# Patient Record
Sex: FEMALE | Race: OTHER | HISPANIC OR LATINO | Employment: FULL TIME | URBAN - METROPOLITAN AREA
[De-identification: names, ages, dates, MRNs, and addresses within clinical notes are randomized per-mention and may not be internally consistent; named-entity substitution may affect disease eponyms.]

---

## 2024-03-25 PROBLEM — Z76.89 ENCOUNTER TO ESTABLISH CARE: Status: ACTIVE | Noted: 2024-03-25

## 2024-03-25 NOTE — PROGRESS NOTES
Date: 04/02/2024  Patient ID: [id number]   Chief Complaint: Establish Care (New patient to establish care)    HPI:   Annamarie Rodarte is a 39 y.o. female who is here today to establish care. Moved to \[facility]\"" 2.5yrs ago. This is first PCP appt in 2yrs. Patient has L thyroid nodule that she has had for 10yrs and noticed that it has grown recently. Last US 4yrs ago was <2cm with normal TFTs. Denies pain, dysphagia, or other sx.  Patient has dysuria, frequent urination, for 6mos. Denies hematuria.  was dx with syphilis and HIV 3mos ago. Pt had bloodwork that was negative for HIV. Pt would like to get checked. Been with  for 12 yrs. Unsure of extramarital relations Not using condoms since she would like to have kids.  Patient also has intermittent L leg swelling, which improved with running.   Patient also has more frequent yellow loose stools, ongoing for 2yrs though. Denies stomach pain. Recently changed diet to healthier and more home cooked meals. Does drink 3 beers 3x/wk. Also has yellowing and thickening of toenail and bunion formation of feet. Sometimes has pain in R foot.       History reviewed. No pertinent past medical history.  Past Surgical History:   Procedure Laterality Date    COLONOSCOPY  2003    for bloody stools. polyp     Review of patient's allergies indicates:  No Known Allergies  No outpatient medications have been marked as taking for the 4/2/24 encounter (Office Visit) with Kena Hillman MD.     Family History   Problem Relation Age of Onset    Diabetes Mother     Vision loss Father     Cancer Maternal Grandfather     Arthritis Maternal Grandmother     Diabetes Maternal Grandmother     Cancer Paternal Grandfather       Social History     Socioeconomic History    Marital status:    Tobacco Use    Smoking status: Never    Smokeless tobacco: Never   Substance and Sexual Activity    Alcohol use: Yes     Alcohol/week: 12.0 standard drinks of alcohol     Types: 12 Cans of beer per week  "   Drug use: Never    Sexual activity: Yes     Partners: Male     Social Determinants of Health     Financial Resource Strain: Medium Risk (4/1/2024)    Overall Financial Resource Strain (CARDIA)     Difficulty of Paying Living Expenses: Somewhat hard   Food Insecurity: Food Insecurity Present (4/1/2024)    Hunger Vital Sign     Worried About Running Out of Food in the Last Year: Sometimes true     Ran Out of Food in the Last Year: Never true   Transportation Needs: No Transportation Needs (4/1/2024)    PRAPARE - Transportation     Lack of Transportation (Medical): No     Lack of Transportation (Non-Medical): No   Physical Activity: Insufficiently Active (4/1/2024)    Exercise Vital Sign     Days of Exercise per Week: 2 days     Minutes of Exercise per Session: 20 min   Stress: Stress Concern Present (4/1/2024)    [place] [employer] - Occupational Stress Questionnaire     Feeling of Stress : To some extent   Social Connections: Unknown (4/1/2024)    Social Connection and Isolation Panel [NHANES]     Frequency of Communication with Friends and Family: Twice a week     Frequency of Social Gatherings with Friends and Family: Never     Active Member of Clubs or Organizations: Yes     Attends Club or Organization Meetings: More than 4 times per year     Marital Status:    Housing Stability: Low Risk  (4/1/2024)    Housing Stability Vital Sign     Unable to Pay for Housing in the Last Year: No     Number of Places Lived in the Last Year: 1     Unstable Housing in the Last Year: No     Patient Care Team:  Kena Hillman MD as PCP - General (Family Medicine)   Subjective:   ROS  See HPI for details  All Other ROS: Negative except as stated in HPI  Objective:   /77   Pulse 63   Temp 98.6 °F (37 °C)   Ht 5' 4.57" (1.64 m)   Wt 108 kg (238 lb 1.6 oz)   SpO2 96%   BMI 40.16 kg/m²   Physical Exam  Constitutional:       Appearance: She is obese.   Neck:      Comments: L thyroid enlarged " and soft/mobile with NTTP or warmth  Musculoskeletal:      Comments: No edema BL LE. BL feet with lateral deviation   Neurological:      Mental Status: She is alert and oriented to person, place, and time.   Psychiatric:         Mood and Affect: Mood normal.         Behavior: Behavior normal.         Thought Content: Thought content normal.         Judgment: Judgment normal.       Assessment:       ICD-10-CM ICD-9-CM   1. Encounter to establish care  Z76.89 V65.8   2. Dysuria  R30.0 788.1   3. Thyroid nodule  E04.1 241.0   4. Exposure to sexually transmitted disease (STD)  Z20.2 V01.6   5. Class 3 severe obesity with body mass index (BMI) of 40.0 to 44.9 in adult, unspecified obesity type, unspecified whether serious comorbidity present  E66.01 278.01    Z68.41 V85.41   6. Left leg swelling  M79.89 729.81   7. Onychomycosis  B35.1 110.1   8. Bilateral bunions  M21.611 727.1    M21.612       Plan:   1. Encounter to establish care  Assessment & Plan:  Obtain routine labs  F/u for wellness      Orders:  -     CBC Auto Differential; Future; Expected date: 04/02/2024  -     Comprehensive Metabolic Panel; Future; Expected date: 04/02/2024    2. Dysuria  Assessment & Plan:  UA + C/S ordered. Order abx as necessary  Report any continuing/worsening symptoms after antibiotic completion such as nausea/vomiting, low back pain, blood in urine, burning with urination, or fever/body aches/chills, etc.  Drink plenty of water daily. Avoid soda and sugary drinks  Increase hydration  Wipe from front to back  Cotton underwear  Avoid douching  Empty bladder before and after intercourse  Urinate frequently. Do not hold urine for extended periods of time  ED precautions            Orders:  -     Urinalysis; Future; Expected date: 04/02/2024  -     Urine culture; Future; Expected date: 04/02/2024    3. Thyroid nodule  Assessment & Plan:  Obtain US and TFTs    Orders:  -     US Soft Tissue Head Neck; Future; Expected date: 04/02/2024  -      TSH; Future; Expected date: 04/02/2024  -     T4, Free; Future; Expected date: 04/02/2024    4. Exposure to sexually transmitted disease (STD)  Assessment & Plan:  Obtain STD panel  Safe sex practices    Orders:  -     CBC Auto Differential; Future; Expected date: 04/02/2024  -     Hepatitis C Antibody; Future; Expected date: 04/02/2024  -     HIV 1/2 Ag/Ab (4th Gen); Future; Expected date: 04/02/2024  -     RPR (DX) with reflex to titer and confirmatory testing; Future; Expected date: 04/02/2024  -     Hepatitis Panel, Acute; Future; Expected date: 04/02/2024    5. Class 3 severe obesity with body mass index (BMI) of 40.0 to 44.9 in adult, unspecified obesity type, unspecified whether serious comorbidity present  Assessment & Plan:  Discuss wt loss options next visit  Obtain routine labs    Orders:  -     CBC Auto Differential; Future; Expected date: 04/02/2024  -     Comprehensive Metabolic Panel; Future; Expected date: 04/02/2024  -     Lipid Panel; Future; Expected date: 04/02/2024  -     TSH; Future; Expected date: 04/02/2024  -     Hemoglobin A1C; Future; Expected date: 04/02/2024  -     Vitamin D; Future; Expected date: 04/02/2024    6. Left leg swelling  Assessment & Plan:  Continue to monitor        7. Onychomycosis  Assessment & Plan:  Refer to podiatry       8. Bilateral bunions  Assessment & Plan:  Refer to podiatry     Orders:  -     Ambulatory referral/consult to Podiatry; Future; Expected date: 04/09/2024                Follow up in about 4 weeks (around 4/30/2024) for Wellness, With Labs; pls print labs/orders for pt. In addition to their scheduled follow up, the patient has also been instructed to follow up on as needed basis.

## 2024-04-02 ENCOUNTER — OFFICE VISIT (OUTPATIENT)
Dept: PRIMARY CARE CLINIC | Facility: CLINIC | Age: 39
End: 2024-04-02
Payer: COMMERCIAL

## 2024-04-02 VITALS
TEMPERATURE: 99 F | HEART RATE: 63 BPM | OXYGEN SATURATION: 96 % | HEIGHT: 65 IN | SYSTOLIC BLOOD PRESSURE: 130 MMHG | BODY MASS INDEX: 39.67 KG/M2 | WEIGHT: 238.13 LBS | DIASTOLIC BLOOD PRESSURE: 77 MMHG

## 2024-04-02 DIAGNOSIS — M79.89 LEFT LEG SWELLING: ICD-10-CM

## 2024-04-02 DIAGNOSIS — M21.611 BILATERAL BUNIONS: ICD-10-CM

## 2024-04-02 DIAGNOSIS — R30.0 DYSURIA: ICD-10-CM

## 2024-04-02 DIAGNOSIS — B35.1 ONYCHOMYCOSIS: ICD-10-CM

## 2024-04-02 DIAGNOSIS — E04.1 THYROID NODULE: ICD-10-CM

## 2024-04-02 DIAGNOSIS — E66.01 CLASS 3 SEVERE OBESITY WITH BODY MASS INDEX (BMI) OF 40.0 TO 44.9 IN ADULT, UNSPECIFIED OBESITY TYPE, UNSPECIFIED WHETHER SERIOUS COMORBIDITY PRESENT: ICD-10-CM

## 2024-04-02 DIAGNOSIS — Z76.89 ENCOUNTER TO ESTABLISH CARE: Primary | ICD-10-CM

## 2024-04-02 DIAGNOSIS — M21.612 BILATERAL BUNIONS: ICD-10-CM

## 2024-04-02 DIAGNOSIS — Z20.2 EXPOSURE TO SEXUALLY TRANSMITTED DISEASE (STD): ICD-10-CM

## 2024-04-02 PROBLEM — E66.813 CLASS 3 SEVERE OBESITY WITH BODY MASS INDEX (BMI) OF 40.0 TO 44.9 IN ADULT: Status: ACTIVE | Noted: 2024-04-02

## 2024-04-02 PROCEDURE — 99204 OFFICE O/P NEW MOD 45 MIN: CPT | Mod: ,,, | Performed by: STUDENT IN AN ORGANIZED HEALTH CARE EDUCATION/TRAINING PROGRAM

## 2024-04-02 NOTE — ASSESSMENT & PLAN NOTE
JOSR ABREU did receive referral from provider regarding Pt is dealing with anxiety  After chart review JOSR ABREU did call Pt  JOSR ABREU introduced herself, also, explained Pt the purpose of this call  Pt stated that she wants to speak with a counselor  JOSR ABREU offered Pt to send a list of [address] resources  Pt stated that she would like to receive the resources by email  JOSR ABREU explained Pt that she will send the resources  Pt confirmed she received the resources  JOSR ABREU asked Pt if there is other social needs that she would like to share  Pt expressed that there is no other social needs at this time  JOSR ABREU explained Pt that JOSR ABREU will close this referral, and she can reach out [facility] for future assistance  Pt seems understanding and thankful for [facility] support  JOSR ABREU will close this referral since Pt declined social needs at this time  JOSR  is remain available for further assistance as needed  UA + C/S ordered. Order abx as necessary  Report any continuing/worsening symptoms after antibiotic completion such as nausea/vomiting, low back pain, blood in urine, burning with urination, or fever/body aches/chills, etc.  Drink plenty of water daily. Avoid soda and sugary drinks  Increase hydration  Wipe from front to back  Cotton underwear  Avoid douching  Empty bladder before and after intercourse  Urinate frequently. Do not hold urine for extended periods of time  ED precautions

## 2024-04-04 LAB
25(OH)D3+25(OH)D2 SERPL-MCNC: 9.2 NG/ML (ref 30–100)
APPEARANCE UR: CLEAR
BACTERIA #/AREA URNS HPF: ABNORMAL /[HPF]
BILIRUB UR QL STRIP: NEGATIVE
CHOLEST SERPL-MCNC: 186 MG/DL (ref 100–199)
COLOR UR: YELLOW
CRYSTALS URNS MICRO: ABNORMAL
EPI CELLS #/AREA URNS HPF: ABNORMAL /HPF (ref 0–10)
GLUCOSE UR QL STRIP: NEGATIVE
HAV IGM SERPL QL IA: NEGATIVE
HBA1C MFR BLD: 5.8 % (ref 4.8–5.6)
HBV CORE IGM SERPL QL IA: NEGATIVE
HBV SURFACE AG SERPL QL IA: NEGATIVE
HCV AB SERPL QL IA: NORMAL
HCV IGG SERPL QL IA: NON REACTIVE
HDLC SERPL-MCNC: 53 MG/DL
HGB UR QL STRIP: NEGATIVE
HIV 1+2 AB+HIV1 P24 AG SERPL QL IA: NON REACTIVE
KETONES UR QL STRIP: NEGATIVE
LDLC SERPL CALC-MCNC: 105 MG/DL (ref 0–99)
LEUKOCYTE ESTERASE UR QL STRIP: NEGATIVE
MICRO URNS: NORMAL
MICRO URNS: NORMAL
MUCOUS THREADS URNS QL MICRO: PRESENT
NITRITE UR QL STRIP: NEGATIVE
PH UR STRIP: 6 [PH] (ref 5–7.5)
PROT UR QL STRIP: NEGATIVE
RBC #/AREA URNS HPF: ABNORMAL /HPF (ref 0–2)
RPR SER QL: NON REACTIVE
SP GR UR STRIP: 1.02 (ref 1–1.03)
SPECIMEN STATUS REPORT: NORMAL
T4 FREE SERPL-MCNC: 1.29 NG/DL (ref 0.82–1.77)
TRIGL SERPL-MCNC: 161 MG/DL (ref 0–149)
TSH SERPL DL<=0.005 MIU/L-ACNC: 3.69 UIU/ML (ref 0.45–4.5)
UROBILINOGEN UR STRIP-MCNC: 0.2 MG/DL (ref 0.2–1)
VLDLC SERPL CALC-MCNC: 28 MG/DL (ref 5–40)
WBC #/AREA URNS HPF: ABNORMAL /HPF (ref 0–5)

## 2024-04-05 ENCOUNTER — DOCUMENTATION ONLY (OUTPATIENT)
Dept: PRIMARY CARE CLINIC | Facility: CLINIC | Age: 39
End: 2024-04-05
Payer: COMMERCIAL

## 2024-04-23 ENCOUNTER — HOSPITAL ENCOUNTER (OUTPATIENT)
Dept: RADIOLOGY | Facility: HOSPITAL | Age: 39
Discharge: HOME | End: 2024-04-23
Attending: STUDENT IN AN ORGANIZED HEALTH CARE EDUCATION/TRAINING PROGRAM
Payer: COMMERCIAL

## 2024-04-23 DIAGNOSIS — E04.1 THYROID NODULE: ICD-10-CM

## 2024-04-23 PROCEDURE — 76536 US EXAM OF HEAD AND NECK: CPT | Mod: TC

## 2024-04-24 ENCOUNTER — PATIENT MESSAGE (OUTPATIENT)
Dept: PRIMARY CARE CLINIC | Facility: CLINIC | Age: 39
End: 2024-04-24
Payer: COMMERCIAL

## 2024-05-01 PROBLEM — Z00.00 WELLNESS EXAMINATION: Status: ACTIVE | Noted: 2024-03-25

## 2024-05-01 NOTE — ASSESSMENT & PLAN NOTE
Provided Annamarie Rodarte with a 5-10 year written screening schedule and personal prevention plan. Recommendations were developed using the USPSTF age appropriate recommendations. Education, counseling, and referrals were provided as needed. After Visit Summary printed and given to patient which includes a list of additional screenings\tests needed.

## 2024-05-01 NOTE — PROGRESS NOTES
Date: 05/06/2024  Patient ID: [id number]   Chief Complaint: Annual Exam    HPI:   Annamarie Rodarte is a 39 y.o. female here today for an annual wellness visit. Reviewed and discussed lab results: Recent labs showed elevated TG, A1c 5.8, low vitD  Recent labs wnl except urobilibogen in UA, , 105 LDL.   Thyroid ultrasound showed dominant left lobe nodule that meets FNA criteria-refer to ENT/endocrine for biopsy. Has telemed endocrinology appt next month - only one available in [place]  Overall she feels well. Has dysuria/frequency    Diet: sandwich, tuna/crackers/salad, quesadilla; chicken/pork/beef/veggies. Does drink 12 drinks/week and also goes out and has drinks. Also has treats with donuts occasionally  Activity level: not much lately    Patient Active Problem List   Diagnosis    Wellness examination    Dysuria    Thyroid nodule    Exposure to sexually transmitted disease (STD)    Class 3 severe obesity with body mass index (BMI) of 40.0 to 44.9 in adult    Left leg swelling    Onychomycosis    Bilateral bunions    Vitamin D deficiency    Other hyperlipidemia    IGT (impaired glucose tolerance)     Current Outpatient Medications   Medication Sig Dispense Refill    carboxymethylcellulose-citric (PLENITY) 0.75 gram Cap Take 1 capsule by mouth once daily. 30 capsule 1    cholecalciferol, vitamin D3, 1,250 mcg (50,000 unit) capsule Take 1 capsule (50,000 Units total) by mouth every 7 days. for 12 doses 12 capsule 0    nitrofurantoin, macrocrystal-monohydrate, (MACROBID) 100 MG capsule Take 1 capsule (100 mg total) by mouth 2 (two) times daily. for 5 days 10 capsule 0     No current facility-administered medications for this visit.     No past medical history on file.  Past Surgical History:   Procedure Laterality Date    COLONOSCOPY  2003    for bloody stools. polyp     Review of patient's allergies indicates:  No Known Allergies  Family History   Problem Relation Name Age of Onset    Diabetes Mother Annamarie  Zuly Isabel     Vision loss Father Flash Rodarte     Cancer Maternal Grandfather Flaco Forbesnandez     Arthritis Maternal Grandmother Annamarie Evans Chalo Driscoll     Diabetes Maternal Grandmother Annamarie Isabel Americo     Cancer Paternal Grandfather Jeremías Rodarte       Social History     Socioeconomic History    Marital status:    Tobacco Use    Smoking status: Never    Smokeless tobacco: Never   Substance and Sexual Activity    Alcohol use: Yes     Alcohol/week: 12.0 standard drinks of alcohol     Types: 12 Cans of beer per week    Drug use: Never    Sexual activity: Yes     Partners: Male     Social Determinants of Health     Financial Resource Strain: Low Risk  (5/6/2024)    Overall Financial Resource Strain (CARDIA)     Difficulty of Paying Living Expenses: Not hard at all   Recent Concern: Financial Resource Strain - Medium Risk (4/1/2024)    Overall Financial Resource Strain (CARDIA)     Difficulty of Paying Living Expenses: Somewhat hard   Food Insecurity: No Food Insecurity (5/6/2024)    Hunger Vital Sign     Worried About Running Out of Food in the Last Year: Never true     Ran Out of Food in the Last Year: Never true   Recent Concern: Food Insecurity - Food Insecurity Present (4/1/2024)    Hunger Vital Sign     Worried About Running Out of Food in the Last Year: Sometimes true     Ran Out of Food in the Last Year: Never true   Transportation Needs: No Transportation Needs (4/1/2024)    PRAPARE - Transportation     Lack of Transportation (Medical): No     Lack of Transportation (Non-Medical): No   Physical Activity: Inactive (5/6/2024)    Exercise Vital Sign     Days of Exercise per Week: 0 days     Minutes of Exercise per Session: 0 min   Stress: No Stress Concern Present (5/6/2024)    [place] [employer] - Occupational Stress Questionnaire     Feeling of Stress : Not at all   Recent Concern: Stress - Stress Concern Present (4/1/2024)     "[place] [employer] - Occupational Stress Questionnaire     Feeling of Stress : To some extent   Housing Stability: Low Risk  (5/6/2024)    Housing Stability Vital Sign     Unable to Pay for Housing in the Last Year: No     Homeless in the Last Year: No     Patient Care Team:  Kena Hillman MD as PCP - General (Family Medicine)     Subjective:     Review of Systems   Constitutional: Negative.  Negative for fever and weight loss.   HENT:  Negative for congestion, hearing loss and sore throat.    Eyes:  Negative for blurred vision.   Respiratory:  Negative for cough and shortness of breath.    Cardiovascular:  Negative for chest pain, palpitations and leg swelling.   Gastrointestinal:  Negative for abdominal pain, blood in stool, constipation, diarrhea, nausea and vomiting.   Genitourinary:  Positive for dysuria, frequency and urgency. Negative for hematuria.   Musculoskeletal:  Negative for joint pain.   Skin:  Negative for rash.   Neurological:  Negative for weakness and headaches.   Psychiatric/Behavioral:  Negative for depression. The patient is not nervous/anxious and does not have insomnia.        See HPI for details  All Other ROS: Negative except as stated in HPI    Objective:     /83   Pulse 69   Ht 5' 4" (1.626 m)   Wt 110.8 kg (244 lb 3.2 oz)   SpO2 96%   BMI 41.92 kg/m²     Physical Exam  Vitals reviewed.   Constitutional:       Appearance: Normal appearance.   HENT:      Head: Normocephalic and atraumatic.      Right Ear: Tympanic membrane, ear canal and external ear normal.      Left Ear: Tympanic membrane, ear canal and external ear normal.      Nose: Nose normal. No congestion or rhinorrhea.      Mouth/Throat:      Mouth: Mucous membranes are moist.      Pharynx: Oropharynx is clear.   Eyes:      General: No scleral icterus.     Extraocular Movements: Extraocular movements intact.      Conjunctiva/sclera: Conjunctivae normal.   Neck:      Comments: Nodule palpable on " L lobe  Cardiovascular:      Rate and Rhythm: Normal rate and regular rhythm.      Pulses: Normal pulses.      Heart sounds: Normal heart sounds.   Pulmonary:      Effort: Pulmonary effort is normal.      Breath sounds: Normal breath sounds.   Abdominal:      General: Abdomen is flat. Bowel sounds are normal.      Palpations: Abdomen is soft.      Tenderness: There is no abdominal tenderness. There is no right CVA tenderness or left CVA tenderness.   Musculoskeletal:         General: No swelling or deformity. Normal range of motion.      Cervical back: Normal range of motion and neck supple.   Skin:     General: Skin is warm and dry.   Neurological:      Mental Status: She is alert and oriented to person, place, and time.   Psychiatric:         Mood and Affect: Mood normal.         Behavior: Behavior normal.         Thought Content: Thought content normal.         Judgment: Judgment normal.         Assessment:       ICD-10-CM ICD-9-CM   1. Wellness examination  Z00.00 V70.0   2. Vitamin D deficiency  E55.9 268.9   3. Other hyperlipidemia  E78.49 272.4   4. IGT (impaired glucose tolerance)  R73.02 790.22   5. Dysuria  R30.0 788.1   6. Class 3 severe obesity with body mass index (BMI) of 40.0 to 44.9 in adult, unspecified obesity type, unspecified whether serious comorbidity present  E66.01 278.01    Z68.41 V85.41   7. Thyroid nodule  E04.1 241.0        Plan:     1. Wellness examination  Assessment & Plan:  Provided Annamarie Rodarte with a 5-10 year written screening schedule and personal prevention plan. Recommendations were developed using the USPSTF age appropriate recommendations. Education, counseling, and referrals were provided as needed. After Visit Summary printed and given to patient which includes a list of additional screenings\tests needed.         2. Vitamin D deficiency  Assessment & Plan:  Vitamin D 50,000u po qwk x 12wks  Recheck level after completed supplementation      Orders:  -     cholecalciferol,  vitamin D3, 1,250 mcg (50,000 unit) capsule; Take 1 capsule (50,000 Units total) by mouth every 7 days. for 12 doses  Dispense: 12 capsule; Refill: 0    3. Other hyperlipidemia  Assessment & Plan:  The ASCVD Risk score (Sarita APPLE, et al., 2019) failed to calculate for the following reasons:    The 2019 ASCVD risk score is only valid for ages 40 to 79   Encouraged weight loss by least 5% and to increase aerobic exercise and resistance training  Limit simple sugars and simple carbohydrate intake-focus on low glycemic foods  Optimize blood sugar control  Can try omega-3 fatty acids, niacin  If still elevated at next lipid panel, consider adding statin or Zetia or Praluent  or Repatha for LDL/ Vascepa for TG and LDL          4. IGT (impaired glucose tolerance)  Assessment & Plan:  Prediabetic if A1c 5.7-6.4%, fasting glucose 100-125 mg/dL  Recommend 7% weight loss and exercise 150 minutes per week, decreased caloric intake (consider Mediterranean, DASH, vegetarian diet)  Follow ADA Diet. Avoid soda, simple sweets, and limit rice/pasta/breads/starches (no more than 45-50 grams per meal).  Maintain healthy weight with goal BMI <30.  Exercise at least 5 times per week for 30 minutes per day.  Associated with improved insulin sensitivity: Dietary fiber, coffee, cinnamon        5. Dysuria  Assessment & Plan:  Obtain UA and UCx    Orders:  -     Urinalysis; Future; Expected date: 05/06/2024  -     Urine culture; Future; Expected date: 05/06/2024  -     nitrofurantoin, macrocrystal-monohydrate, (MACROBID) 100 MG capsule; Take 1 capsule (100 mg total) by mouth 2 (two) times daily. for 5 days  Dispense: 10 capsule; Refill: 0    6. Class 3 severe obesity with body mass index (BMI) of 40.0 to 44.9 in adult, unspecified obesity type, unspecified whether serious comorbidity present  Assessment & Plan:  Attempt Plenity qd  Lifestyle modifications    Orders:  -     carboxymethylcellulose-citric (PLENITY) 0.75 gram Cap; Take 1 capsule  by mouth once daily.  Dispense: 30 capsule; Refill: 1    7. Thyroid nodule  Assessment & Plan:  F/u with endocrinology for bx           Medication List with Changes/Refills   New Medications    CARBOXYMETHYLCELLULOSE-CITRIC (PLENITY) 0.75 GRAM CAP    Take 1 capsule by mouth once daily.       Start Date: 5/6/2024  End Date: --    CHOLECALCIFEROL, VITAMIN D3, 1,250 MCG (50,000 UNIT) CAPSULE    Take 1 capsule (50,000 Units total) by mouth every 7 days. for 12 doses       Start Date: 5/6/2024  End Date: 7/23/2024    NITROFURANTOIN, MACROCRYSTAL-MONOHYDRATE, (MACROBID) 100 MG CAPSULE    Take 1 capsule (100 mg total) by mouth 2 (two) times daily. for 5 days       Start Date: 5/6/2024  End Date: 5/11/2024        The patient's Health Maintenance was reviewed and the following appears to be due at this time:   Health Maintenance Due   Topic Date Due    Cervical Cancer Screening  Never done    TETANUS VACCINE  Never done    COVID-19 Vaccine (1 - 2023-24 season) Never done     Health Maintenance Topics with due status: Not Due       Topic Last Completion Date    Hemoglobin A1c (Prediabetes) 04/03/2024    Influenza Vaccine Not Due        Alcohol/Tobacco Use - Discussed importance of smoking avoidance/cessation and limiting alcohol intake.    CVD Risk Factors - Reviewed and discussed with patient    Obesity/Physical Activity - BMI = Body mass index is 41.92 kg/m².. Encouraged 30 minute daily physical activity, 5 days per week.     STD screening (At least once 15-65y or when necessary) - negative     Depression screening (Every year or when necessary)- negative    Breast Cancer Screening - futre    Cervical Cancer Screening - 2022 wnl and benign cyst. Rpt in 3-5yrs    Colon Cancer Screening - future    Osteoporosis Screening (start at 66yo or 50y with risk) - future    Eye Exam - Recommend annual eye exams.    Dental Exam - Recommend biannual exams.     Vaccinations - will obtain at pharmacy    Follow up in about 4 weeks (around  6/3/2024) for Weight loss, Print labs for pt. In addition to their scheduled follow up, the patient has also been instructed to follow up on as needed basis.

## 2024-05-06 ENCOUNTER — OFFICE VISIT (OUTPATIENT)
Dept: PRIMARY CARE CLINIC | Facility: CLINIC | Age: 39
End: 2024-05-06
Payer: COMMERCIAL

## 2024-05-06 VITALS
HEART RATE: 69 BPM | BODY MASS INDEX: 41.69 KG/M2 | WEIGHT: 244.19 LBS | DIASTOLIC BLOOD PRESSURE: 83 MMHG | SYSTOLIC BLOOD PRESSURE: 130 MMHG | HEIGHT: 64 IN | OXYGEN SATURATION: 96 %

## 2024-05-06 DIAGNOSIS — Z00.00 WELLNESS EXAMINATION: Primary | ICD-10-CM

## 2024-05-06 DIAGNOSIS — R73.02 IGT (IMPAIRED GLUCOSE TOLERANCE): ICD-10-CM

## 2024-05-06 DIAGNOSIS — E55.9 VITAMIN D DEFICIENCY: ICD-10-CM

## 2024-05-06 DIAGNOSIS — R30.0 DYSURIA: ICD-10-CM

## 2024-05-06 DIAGNOSIS — E78.49 OTHER HYPERLIPIDEMIA: ICD-10-CM

## 2024-05-06 DIAGNOSIS — E66.01 CLASS 3 SEVERE OBESITY WITH BODY MASS INDEX (BMI) OF 40.0 TO 44.9 IN ADULT, UNSPECIFIED OBESITY TYPE, UNSPECIFIED WHETHER SERIOUS COMORBIDITY PRESENT: ICD-10-CM

## 2024-05-06 DIAGNOSIS — E04.1 THYROID NODULE: ICD-10-CM

## 2024-05-06 PROCEDURE — 99213 OFFICE O/P EST LOW 20 MIN: CPT | Mod: 25,,, | Performed by: STUDENT IN AN ORGANIZED HEALTH CARE EDUCATION/TRAINING PROGRAM

## 2024-05-06 PROCEDURE — 99395 PREV VISIT EST AGE 18-39: CPT | Mod: ,,, | Performed by: STUDENT IN AN ORGANIZED HEALTH CARE EDUCATION/TRAINING PROGRAM

## 2024-05-06 RX ORDER — CARBOXYMETHYLCELLULOSE/CITRIC 0.75 G
1 CAPSULE ORAL DAILY
Qty: 30 CAPSULE | Refills: 1 | Status: SHIPPED | OUTPATIENT
Start: 2024-05-06 | End: 2024-06-03 | Stop reason: SDUPTHER

## 2024-05-06 RX ORDER — ASPIRIN 325 MG
50000 TABLET, DELAYED RELEASE (ENTERIC COATED) ORAL
Qty: 12 CAPSULE | Refills: 0 | Status: SHIPPED | OUTPATIENT
Start: 2024-05-06 | End: 2024-07-23

## 2024-05-06 RX ORDER — NITROFURANTOIN 25; 75 MG/1; MG/1
100 CAPSULE ORAL 2 TIMES DAILY
Qty: 10 CAPSULE | Refills: 0 | Status: SHIPPED | OUTPATIENT
Start: 2024-05-06 | End: 2024-05-11

## 2024-05-06 NOTE — ASSESSMENT & PLAN NOTE
The ASCVD Risk score (Sarita APPLE, et al., 2019) failed to calculate for the following reasons:    The 2019 ASCVD risk score is only valid for ages 40 to 79   Encouraged weight loss by least 5% and to increase aerobic exercise and resistance training  Limit simple sugars and simple carbohydrate intake-focus on low glycemic foods  Optimize blood sugar control  Can try omega-3 fatty acids, niacin  If still elevated at next lipid panel, consider adding statin or Zetia or Praluent  or Repatha for LDL/ Vascepa for TG and LDL

## 2024-05-09 ENCOUNTER — TELEPHONE (OUTPATIENT)
Dept: PRIMARY CARE CLINIC | Facility: CLINIC | Age: 39
End: 2024-05-09
Payer: COMMERCIAL

## 2024-05-09 DIAGNOSIS — E04.1 THYROID NODULE: Primary | ICD-10-CM

## 2024-05-09 NOTE — TELEPHONE ENCOUNTER
----- Message from [facility] sent at 5/9/2024  1:44 PM CDT -----  .Type:  Patient Requesting Referral    Who Called: pt    Does the patient already have the specialty appointment scheduled?: no    If yes, what is the date of that appointment?: no    Referral to What Specialty: Endocrinologist     Reason for Referral: thyroid nodule    Does the patient want the referral with a specific physician?: Dr. Willi Mujica fax# [phone]    Is the specialist an [facility]?: non    Patient Requesting a Response?: yes    Would the patient rather a call back or a response via [facility]?      Best Call Back Number: [phone]    Additional Information:  pt wants this referral to go to this doctor

## 2024-05-28 NOTE — PROGRESS NOTES
Date: 06/03/2024  Patient ID: [id number]   Chief Complaint: Follow-up (Discuss medications)    HPI:   Annamarie Rodarte is a 39 y.o. female here today for Follow-up (Discuss medications)  Last visit Plenity was started for obesity, which she was unable to obtain since not in stock at pharmacy. She has lost a couple pounds since last visit. She was also started on terbinafine for toenail fungus and Mobic for pain/inflammation. She had UA done last week for UTI sx, which she just completed Macrobid course. Her UTI symptoms have improved, and recent UA was wnl.     Patient Active Problem List   Diagnosis    Wellness examination    Thyroid nodule    Exposure to sexually transmitted disease (STD)    Class 3 severe obesity with body mass index (BMI) of 40.0 to 44.9 in adult    Left leg swelling    Onychomycosis    Bilateral bunions    Vitamin D deficiency    Other hyperlipidemia    IGT (impaired glucose tolerance)     Outpatient Medications Marked as Taking for the 6/3/24 encounter (Office Visit) with Kena Hillman MD   Medication Sig Dispense Refill    cholecalciferol, vitamin D3, 1,250 mcg (50,000 unit) capsule Take 1 capsule (50,000 Units total) by mouth every 7 days. for 12 doses 12 capsule 0    meloxicam (MOBIC) 15 MG tablet Take 15 mg by mouth.      terbinafine HCL (LAMISIL) 250 mg tablet Take 250 mg by mouth.       Past Medical History:   Diagnosis Date    Dysuria 04/02/2024     Past Surgical History:   Procedure Laterality Date    COLONOSCOPY  2003    for bloody stools. polyp     Review of patient's allergies indicates:  No Known Allergies  Family History   Problem Relation Name Age of Onset    Diabetes Mother Giulia Diogo Isabel     Vision loss Father Flash Rodarte     Cancer Maternal Grandfather Flaco Holtjohnathan Martinez     Arthritis Maternal Grandmother Annamarie Driscoll     Diabetes Maternal Grandmother Annamarie Driscoll     Cancer Paternal Grandfather Jeremías Rodarte        Social History     Socioeconomic History    Marital status:    Tobacco Use    Smoking status: Never    Smokeless tobacco: Never   Substance and Sexual Activity    Alcohol use: Yes     Alcohol/week: 12.0 standard drinks of alcohol     Types: 12 Cans of beer per week    Drug use: Never    Sexual activity: Yes     Partners: Male     Social Determinants of Health     Financial Resource Strain: Low Risk  (5/6/2024)    Overall Financial Resource Strain (CARDIA)     Difficulty of Paying Living Expenses: Not hard at all   Recent Concern: Financial Resource Strain - Medium Risk (4/1/2024)    Overall Financial Resource Strain (CARDIA)     Difficulty of Paying Living Expenses: Somewhat hard   Food Insecurity: No Food Insecurity (5/6/2024)    Hunger Vital Sign     Worried About Running Out of Food in the Last Year: Never true     Ran Out of Food in the Last Year: Never true   Recent Concern: Food Insecurity - Food Insecurity Present (4/1/2024)    Hunger Vital Sign     Worried About Running Out of Food in the Last Year: Sometimes true     Ran Out of Food in the Last Year: Never true   Transportation Needs: No Transportation Needs (5/6/2024)    TRANSPORTATION NEEDS     Transportation : No   Physical Activity: Inactive (5/6/2024)    Exercise Vital Sign     Days of Exercise per Week: 0 days     Minutes of Exercise per Session: 0 min   Stress: No Stress Concern Present (5/6/2024)    [employer] - Occupational Stress Questionnaire     Feeling of Stress : Not at all   Recent Concern: Stress - Stress Concern Present (4/1/2024)    [employer] - Occupational Stress Questionnaire     Feeling of Stress : To some extent   Housing Stability: Low Risk  (5/6/2024)    Housing Stability Vital Sign     Unable to Pay for Housing in the Last Year: No     Homeless in the Last Year: No     Patient Care Team:  Kena Hillman MD as PCP - General (Family Medicine)   Subjective:  "  ROS  See HPI for details  All Other ROS: Negative except as stated in HPI.   Objective:   /75   Pulse 63   Ht 5' 4" (1.626 m)   Wt 110 kg (242 lb 9.6 oz)   SpO2 95%   BMI 41.64 kg/m²   Physical Exam  General: NAD  Eye: EOMI  HENT: no nasal discharge  Respiratory: non-labored breathing  Musculoskeletal: ambulates independently. No obvious deformity  Integumentary: no apparent discoloration  Neurologic: Alert, oriented to person and situation  Cognition and Speech: Speech clear and coherent.   Psychiatric: Cooperative    Assessment:       ICD-10-CM ICD-9-CM   1. Class 3 severe obesity due to excess calories without serious comorbidity with body mass index (BMI) of 40.0 to 44.9 in adult  E66.01 278.01    Z68.41 V85.41      Plan:   1. Class 3 severe obesity due to excess calories without serious comorbidity with body mass index (BMI) of 40.0 to 44.9 in adult  Assessment & Plan:  Attempt Plenity with printed rx  Consider alternative if not available  Continue lifestyle modifications    Orders:  -     carboxymethylcellulose-citric (PLENITY) 0.75 gram Cap; Take 1 capsule by mouth once daily.  Dispense: 30 capsule; Refill: 3         Medication List with Changes/Refills   Current Medications    CHOLECALCIFEROL, VITAMIN D3, 1,250 MCG (50,000 UNIT) CAPSULE    Take 1 capsule (50,000 Units total) by mouth every 7 days. for 12 doses       Start Date: 5/6/2024  End Date: 7/23/2024    MELOXICAM (MOBIC) 15 MG TABLET    Take 15 mg by mouth.       Start Date: 5/15/2024 End Date: --    TERBINAFINE HCL (LAMISIL) 250 MG TABLET    Take 250 mg by mouth.       Start Date: 5/15/2024 End Date: --   Changed and/or Refilled Medications    Modified Medication Previous Medication    CARBOXYMETHYLCELLULOSE-CITRIC (PLENITY) 0.75 GRAM CAP carboxymethylcellulose-citric (PLENITY) 0.75 gram Cap       Take 1 capsule by mouth once daily.    Take 1 capsule by mouth once daily.       Start Date: 6/3/2024  End Date: --    Start Date: 5/6/2024  " End Date: 6/3/2024        Follow up in about 8 weeks (around 7/29/2024) for Obesity. In addition to their scheduled follow up, the patient has also been instructed to follow up on as needed basis.

## 2024-06-03 ENCOUNTER — OFFICE VISIT (OUTPATIENT)
Dept: PRIMARY CARE CLINIC | Facility: CLINIC | Age: 39
End: 2024-06-03
Payer: COMMERCIAL

## 2024-06-03 VITALS
HEART RATE: 63 BPM | OXYGEN SATURATION: 95 % | HEIGHT: 64 IN | SYSTOLIC BLOOD PRESSURE: 119 MMHG | DIASTOLIC BLOOD PRESSURE: 75 MMHG | WEIGHT: 242.63 LBS | BODY MASS INDEX: 41.42 KG/M2

## 2024-06-03 DIAGNOSIS — E66.01 CLASS 3 SEVERE OBESITY DUE TO EXCESS CALORIES WITHOUT SERIOUS COMORBIDITY WITH BODY MASS INDEX (BMI) OF 40.0 TO 44.9 IN ADULT: Primary | ICD-10-CM

## 2024-06-03 PROBLEM — R30.0 DYSURIA: Status: RESOLVED | Noted: 2024-04-02 | Resolved: 2024-06-03

## 2024-06-03 PROCEDURE — 99214 OFFICE O/P EST MOD 30 MIN: CPT | Mod: ,,, | Performed by: STUDENT IN AN ORGANIZED HEALTH CARE EDUCATION/TRAINING PROGRAM

## 2024-06-03 RX ORDER — CARBOXYMETHYLCELLULOSE/CITRIC 0.75 G
1 CAPSULE ORAL DAILY
Qty: 30 CAPSULE | Refills: 3 | Status: SHIPPED | OUTPATIENT
Start: 2024-06-03

## 2024-06-03 RX ORDER — MELOXICAM 15 MG/1
15 TABLET ORAL
COMMUNITY
Start: 2024-05-15

## 2024-06-03 RX ORDER — TERBINAFINE HYDROCHLORIDE 250 MG/1
250 TABLET ORAL
COMMUNITY
Start: 2024-05-15

## 2024-06-03 NOTE — ASSESSMENT & PLAN NOTE
Attempt Plenity with printed rx  Consider alternative if not available  Continue lifestyle modifications

## 2024-06-13 ENCOUNTER — PATIENT MESSAGE (OUTPATIENT)
Dept: PRIMARY CARE CLINIC | Facility: CLINIC | Age: 39
End: 2024-06-13
Payer: COMMERCIAL

## 2024-06-13 DIAGNOSIS — E66.01 CLASS 3 SEVERE OBESITY DUE TO EXCESS CALORIES WITHOUT SERIOUS COMORBIDITY WITH BODY MASS INDEX (BMI) OF 40.0 TO 44.9 IN ADULT: Primary | ICD-10-CM

## 2024-06-14 NOTE — TELEPHONE ENCOUNTER
Please inform patient that Orlistat was sent to pharmacy. It may cause stomach upset. If this doesn't work, we can set up telemed visit to discuss    Thank you,    Dr. Hillman

## 2024-07-23 NOTE — PROGRESS NOTES
Date: 08/01/2024  Patient ID: [id number]   Chief Complaint: Follow-up    HPI:   Annamarie Rodarte is a 39 y.o. female here today for Follow-up  Last visit Plenity was attempted for obesity, but pt could not obtain medication. She is taking Christina. She is eating healthy but does drink 2beers in evening. Would like medication that helps with weight loss and drinking    Patient Active Problem List   Diagnosis    Wellness examination    Thyroid nodule    Exposure to sexually transmitted disease (STD)    Class 3 severe obesity with body mass index (BMI) of 40.0 to 44.9 in adult    Left leg swelling    Onychomycosis    Bilateral bunions    Vitamin D deficiency    Other hyperlipidemia    IGT (impaired glucose tolerance)    Alcohol use     Outpatient Medications Marked as Taking for the 8/1/24 encounter (Office Visit) with Kena Hillman MD   Medication Sig Dispense Refill    meloxicam (MOBIC) 15 MG tablet Take 15 mg by mouth.      orlistat (CHRISTINA) 60 MG capsule Take 1 capsule (60 mg total) by mouth 3 (three) times daily with meals. 90 capsule 11    terbinafine HCL (LAMISIL) 250 mg tablet Take 250 mg by mouth.       Past Medical History:   Diagnosis Date    Dysuria 04/02/2024     Past Surgical History:   Procedure Laterality Date    COLONOSCOPY  2003    for bloody stools. polyp     Review of patient's allergies indicates:  No Known Allergies  Family History   Problem Relation Name Age of Onset    Diabetes Mother Annmaarie Caruso Diogo Isabel     Vision loss Father Flash Rodarte     Cancer Maternal Grandfather Flaco Diogo Martinez     Arthritis Maternal Grandmother Annamarie Driscoll     Diabetes Maternal Grandmother Annamarie Driscoll     Cancer Paternal Grandfather Jeremías Rodarte       Social History     Socioeconomic History    Marital status:    Tobacco Use    Smoking status: Never    Smokeless tobacco: Never   Substance and Sexual Activity    Alcohol use: Yes     Alcohol/week: 12.0 standard  "drinks of alcohol     Types: 12 Cans of beer per week    Drug use: Never    Sexual activity: Yes     Partners: Male     Social Determinants of Health     Financial Resource Strain: Low Risk  (5/6/2024)    Overall Financial Resource Strain (CARDIA)     Difficulty of Paying Living Expenses: Not hard at all   Recent Concern: Financial Resource Strain - Medium Risk (4/1/2024)    Overall Financial Resource Strain (CARDIA)     Difficulty of Paying Living Expenses: Somewhat hard   Food Insecurity: No Food Insecurity (5/6/2024)    Hunger Vital Sign     Worried About Running Out of Food in the Last Year: Never true     Ran Out of Food in the Last Year: Never true   Recent Concern: Food Insecurity - Food Insecurity Present (4/1/2024)    Hunger Vital Sign     Worried About Running Out of Food in the Last Year: Sometimes true     Ran Out of Food in the Last Year: Never true   Transportation Needs: No Transportation Needs (5/6/2024)    TRANSPORTATION NEEDS     Transportation : No   Physical Activity: Inactive (5/6/2024)    Exercise Vital Sign     Days of Exercise per Week: 0 days     Minutes of Exercise per Session: 0 min   Stress: No Stress Concern Present (5/6/2024)    [employer] [employer] - Occupational Stress Questionnaire     Feeling of Stress : Not at all   Recent Concern: Stress - Stress Concern Present (4/1/2024)    [employer] [place] [employer] - Occupational Stress Questionnaire     Feeling of Stress : To some extent   Housing Stability: Low Risk  (5/6/2024)    Housing Stability Vital Sign     Unable to Pay for Housing in the Last Year: No     Homeless in the Last Year: No     Patient Care Team:  Kena Hillman MD as PCP - General (Family Medicine)   Subjective:   ROS  See HPI for details  All Other ROS: Negative except as stated in HPI.   Objective:   /78 (BP Location: Right arm, Patient Position: Sitting, BP Method: Large (Automatic))   Pulse 60   Resp 17   Ht 5' 4" (1.626 " m)   Wt 107 kg (236 lb)   SpO2 97%   BMI 40.51 kg/m²   Physical Exam  Constitutional:       Appearance: Normal appearance. She is obese.   Pulmonary:      Effort: Pulmonary effort is normal.   Neurological:      Mental Status: She is alert and oriented to person, place, and time.       Assessment:       ICD-10-CM ICD-9-CM   1. Class 3 severe obesity due to excess calories without serious comorbidity with body mass index (BMI) of 40.0 to 44.9 in adult  E66.01 278.01    Z68.41 V85.41   2. Alcohol use  Z78.9 V49.89      Plan:   1. Class 3 severe obesity due to excess calories without serious comorbidity with body mass index (BMI) of 40.0 to 44.9 in adult  Overview:  Plenity unavailable  Christina helped some    Assessment & Plan:  Attempt contrave for weight loss and drinking. AE discussed. Sample given to patient  Continue lifestyle modifications    Orders:  -     naltrexone-bupropion (CONTRAVE) 8-90 mg TbSR; Take 1 tablet by mouth 2 (two) times daily.  Dispense: 60 tablet; Refill: 11    2. Alcohol use  Assessment & Plan:  Alcohol cessation counseling 3-5min  Attempt Contrave for wt loss and alcohol             Medication List with Changes/Refills   New Medications    NALTREXONE-BUPROPION (CONTRAVE) 8-90 MG TBSR    Take 1 tablet by mouth 2 (two) times daily.       Start Date: 8/1/2024  End Date: --   Current Medications    MELOXICAM (MOBIC) 15 MG TABLET    Take 15 mg by mouth.       Start Date: 5/15/2024 End Date: --    ORLISTAT (CHRISTINA) 60 MG CAPSULE    Take 1 capsule (60 mg total) by mouth 3 (three) times daily with meals.       Start Date: 6/14/2024 End Date: 6/14/2025    TERBINAFINE HCL (LAMISIL) 250 MG TABLET    Take 250 mg by mouth.       Start Date: 5/15/2024 End Date: --   Discontinued Medications    CARBOXYMETHYLCELLULOSE-CITRIC (PLENITY) 0.75 GRAM CAP    Take 1 capsule by mouth once daily.       Start Date: 6/3/2024  End Date: 8/1/2024        Follow up in about 4 weeks (around 8/29/2024) for Obesity, Weight  loss. In addition to their scheduled follow up, the patient has also been instructed to follow up on as needed basis.

## 2024-08-01 ENCOUNTER — OFFICE VISIT (OUTPATIENT)
Dept: PRIMARY CARE CLINIC | Facility: CLINIC | Age: 39
End: 2024-08-01
Payer: COMMERCIAL

## 2024-08-01 VITALS
HEART RATE: 60 BPM | WEIGHT: 236 LBS | OXYGEN SATURATION: 97 % | HEIGHT: 64 IN | RESPIRATION RATE: 17 BRPM | SYSTOLIC BLOOD PRESSURE: 121 MMHG | BODY MASS INDEX: 40.29 KG/M2 | DIASTOLIC BLOOD PRESSURE: 78 MMHG

## 2024-08-01 DIAGNOSIS — Z78.9 ALCOHOL USE: ICD-10-CM

## 2024-08-01 DIAGNOSIS — E66.01 CLASS 3 SEVERE OBESITY DUE TO EXCESS CALORIES WITHOUT SERIOUS COMORBIDITY WITH BODY MASS INDEX (BMI) OF 40.0 TO 44.9 IN ADULT: Primary | ICD-10-CM

## 2024-08-01 PROBLEM — F10.90 ALCOHOL USE: Status: ACTIVE | Noted: 2024-08-01

## 2024-08-01 PROCEDURE — 99214 OFFICE O/P EST MOD 30 MIN: CPT | Mod: ,,, | Performed by: STUDENT IN AN ORGANIZED HEALTH CARE EDUCATION/TRAINING PROGRAM

## 2024-08-01 NOTE — ASSESSMENT & PLAN NOTE
Attempt contrave for weight loss and drinking. AE discussed. Sample given to patient  Continue lifestyle modifications

## 2024-08-05 PROBLEM — Z00.00 WELLNESS EXAMINATION: Status: RESOLVED | Noted: 2024-03-25 | Resolved: 2024-08-05

## 2024-08-07 ENCOUNTER — TELEPHONE (OUTPATIENT)
Dept: PRIMARY CARE CLINIC | Facility: CLINIC | Age: 39
End: 2024-08-07
Payer: COMMERCIAL

## 2024-08-07 DIAGNOSIS — E66.01 CLASS 3 SEVERE OBESITY DUE TO EXCESS CALORIES WITHOUT SERIOUS COMORBIDITY WITH BODY MASS INDEX (BMI) OF 40.0 TO 44.9 IN ADULT: Primary | ICD-10-CM

## 2024-08-07 RX ORDER — NALTREXONE HYDROCHLORIDE 50 MG/1
50 TABLET, FILM COATED ORAL DAILY
Qty: 30 TABLET | Refills: 11 | Status: SHIPPED | OUTPATIENT
Start: 2024-08-07

## 2024-08-07 RX ORDER — BUPROPION HYDROCHLORIDE 150 MG/1
150 TABLET ORAL DAILY
Qty: 30 TABLET | Refills: 11 | Status: SHIPPED | OUTPATIENT
Start: 2024-08-07 | End: 2025-08-07

## 2024-08-26 NOTE — PROGRESS NOTES
Date: 09/04/2024  Patient ID: [id number]   Chief Complaint: Follow-up (Follow up for weight )    HPI:   Annamarie Rodarte is a 39 y.o. female here today for Follow-up (Follow up for weight )  Last visit Contrave was attempted for weight loss and alcohol. She has lost 12lbs since a couple mos ago. She also has decreased alcohol to a few drinks in the weekend. She also has appt with endocrinology in February 2025. She is overall feeling well and denies acute complaints.  She is trying to conceive, and we discussed risks/benefits of continuing medication if she becomes pregnant    Patient Active Problem List   Diagnosis    Thyroid nodule    Exposure to sexually transmitted disease (STD)    Class 3 severe obesity with body mass index (BMI) of 40.0 to 44.9 in adult    Left leg swelling    Onychomycosis    Bilateral bunions    Vitamin D deficiency    Other hyperlipidemia    IGT (impaired glucose tolerance)    Alcohol use     Outpatient Medications Marked as Taking for the 9/4/24 encounter (Office Visit) with Kena Hillman MD   Medication Sig Dispense Refill    buPROPion (WELLBUTRIN XL) 150 MG TB24 tablet Take 1 tablet (150 mg total) by mouth once daily. 30 tablet 11    meloxicam (MOBIC) 15 MG tablet Take 15 mg by mouth.      naltrexone (DEPADE) 50 mg tablet Take 1 tablet (50 mg total) by mouth once daily. 30 tablet 11    orlistat (CHRISTINA) 60 MG capsule Take 1 capsule (60 mg total) by mouth 3 (three) times daily with meals. 90 capsule 11     Past Medical History:   Diagnosis Date    Dysuria 04/02/2024     Past Surgical History:   Procedure Laterality Date    COLONOSCOPY  2003    for bloody stools. polyp     Review of patient's allergies indicates:  No Known Allergies  Family History   Problem Relation Name Age of Onset    Diabetes Mother Annamarie Isabel     Vision loss Father Flash Rodarte     Cancer Maternal Grandfather Flacoanisha Martinez     Arthritis Maternal Grandmother Annamarie Driscoll      Diabetes Maternal Grandmother Annamarie Driscoll     Cancer Paternal Grandfather Jeremías Rodarte       Social History     Socioeconomic History    Marital status:    Tobacco Use    Smoking status: Never    Smokeless tobacco: Never   Substance and Sexual Activity    Alcohol use: Yes     Alcohol/week: 12.0 standard drinks of alcohol     Types: 12 Cans of beer per week    Drug use: Never    Sexual activity: Yes     Partners: Male     Social Determinants of Health     Financial Resource Strain: Low Risk  (5/6/2024)    Overall Financial Resource Strain (CARDIA)     Difficulty of Paying Living Expenses: Not hard at all   Recent Concern: Financial Resource Strain - Medium Risk (4/1/2024)    Overall Financial Resource Strain (CARDIA)     Difficulty of Paying Living Expenses: Somewhat hard   Food Insecurity: No Food Insecurity (5/6/2024)    Hunger Vital Sign     Worried About Running Out of Food in the Last Year: Never true     Ran Out of Food in the Last Year: Never true   Recent Concern: Food Insecurity - Food Insecurity Present (4/1/2024)    Hunger Vital Sign     Worried About Running Out of Food in the Last Year: Sometimes true     Ran Out of Food in the Last Year: Never true   Transportation Needs: No Transportation Needs (5/6/2024)    TRANSPORTATION NEEDS     Transportation : No   Physical Activity: Inactive (5/6/2024)    Exercise Vital Sign     Days of Exercise per Week: 0 days     Minutes of Exercise per Session: 0 min   Stress: No Stress Concern Present (5/6/2024)    [employer] [place] [employer] - Occupational Stress Questionnaire     Feeling of Stress : Not at all   Recent Concern: Stress - Stress Concern Present (4/1/2024)    [employer] [place] [employer] - Occupational Stress Questionnaire     Feeling of Stress : To some extent   Housing Stability: Low Risk  (5/6/2024)    Housing Stability Vital Sign     Unable to Pay for Housing in the Last Year: No     Homeless in  "the Last Year: No     Patient Care Team:  Kena Hillman MD as PCP - General (Family Medicine)   Subjective:   ROS  See HPI for details  All Other ROS: Negative except as stated in HPI.   Objective:   /75   Pulse 69   Ht 5' 4" (1.626 m)   Wt 104.6 kg (230 lb 11.2 oz)   SpO2 98%   BMI 39.60 kg/m²   Physical Exam  Constitutional:       Appearance: Normal appearance. She is obese.   Pulmonary:      Effort: Pulmonary effort is normal.   Neurological:      Mental Status: She is alert and oriented to person, place, and time.       Assessment:       ICD-10-CM ICD-9-CM   1. Class 3 severe obesity due to excess calories without serious comorbidity with body mass index (BMI) of 40.0 to 44.9 in adult  E66.01 278.01    Z68.41 V85.41   2. Alcohol use  Z78.9 V49.89      Plan:   1. Class 3 severe obesity due to excess calories without serious comorbidity with body mass index (BMI) of 40.0 to 44.9 in adult  Overview:  Plenity unavailable  Christina helped some    Assessment & Plan:  Improving  Continue Wellbutrin XL 150mg qd and Naltrexone 50mg qd  Continue lifestyle modifications      2. Alcohol use  Assessment & Plan:  Improved  Continue Wellbutrin and Naltrexone             Medication List with Changes/Refills   Current Medications    BUPROPION (WELLBUTRIN XL) 150 MG TB24 TABLET    Take 1 tablet (150 mg total) by mouth once daily.       Start Date: 8/7/2024  End Date: 8/7/2025    MELOXICAM (MOBIC) 15 MG TABLET    Take 15 mg by mouth.       Start Date: 5/15/2024 End Date: --    NALTREXONE (DEPADE) 50 MG TABLET    Take 1 tablet (50 mg total) by mouth once daily.       Start Date: 8/7/2024  End Date: --    ORLISTAT (CHRISTINA) 60 MG CAPSULE    Take 1 capsule (60 mg total) by mouth 3 (three) times daily with meals.       Start Date: 6/14/2024 End Date: 6/14/2025    TERBINAFINE HCL (LAMISIL) 250 MG TABLET    Take 250 mg by mouth.       Start Date: 5/15/2024 End Date: --        Follow up in about 3 months (around 12/4/2024) for " Weight loss; discuss pregnancy medications. In addition to their scheduled follow up, the patient has also been instructed to follow up on as needed basis.

## 2024-09-04 ENCOUNTER — OFFICE VISIT (OUTPATIENT)
Dept: PRIMARY CARE CLINIC | Facility: CLINIC | Age: 39
End: 2024-09-04
Payer: COMMERCIAL

## 2024-09-04 VITALS
HEART RATE: 69 BPM | DIASTOLIC BLOOD PRESSURE: 75 MMHG | BODY MASS INDEX: 39.38 KG/M2 | WEIGHT: 230.69 LBS | SYSTOLIC BLOOD PRESSURE: 132 MMHG | OXYGEN SATURATION: 98 % | HEIGHT: 64 IN

## 2024-09-04 DIAGNOSIS — E66.01 CLASS 3 SEVERE OBESITY DUE TO EXCESS CALORIES WITHOUT SERIOUS COMORBIDITY WITH BODY MASS INDEX (BMI) OF 40.0 TO 44.9 IN ADULT: Primary | ICD-10-CM

## 2024-09-04 DIAGNOSIS — Z78.9 ALCOHOL USE: ICD-10-CM

## 2024-09-04 PROCEDURE — 99213 OFFICE O/P EST LOW 20 MIN: CPT | Mod: ,,, | Performed by: STUDENT IN AN ORGANIZED HEALTH CARE EDUCATION/TRAINING PROGRAM

## 2024-09-04 NOTE — ASSESSMENT & PLAN NOTE
Improving  Continue Wellbutrin XL 150mg qd and Naltrexone 50mg qd  Continue lifestyle modifications

## 2024-09-24 ENCOUNTER — TELEPHONE (OUTPATIENT)
Dept: PRIMARY CARE CLINIC | Facility: CLINIC | Age: 39
End: 2024-09-24
Payer: COMMERCIAL

## 2024-09-24 NOTE — TELEPHONE ENCOUNTER
----- Message from Gabriela Arrington sent at 9/24/2024 11:56 AM CDT -----  .Who Called: Annamarie Rodarte    Caller is requesting assistance/information from provider's office.    Symptoms (please be specific): n/a   How long has patient had these symptoms:  n/a  List of preferred pharmacies on file (remove unneeded): [unfilled]  If different, enter pharmacy into here including location and phone number: n/a      Preferred Method of Contact: Phone Call  Patient's Preferred Phone Number on File: [phone]   Best Call Back Number, if different:  Additional Information: Pt is calling to advise the provider that she is pregnant and has some additional questions on what are er next steps please call to advise.

## 2024-09-26 ENCOUNTER — HOSPITAL ENCOUNTER (EMERGENCY)
Facility: HOSPITAL | Age: 39
Discharge: HOME | End: 2024-09-26
Attending: EMERGENCY MEDICINE
Payer: COMMERCIAL

## 2024-09-26 VITALS
SYSTOLIC BLOOD PRESSURE: 114 MMHG | TEMPERATURE: 98 F | DIASTOLIC BLOOD PRESSURE: 58 MMHG | HEART RATE: 54 BPM | OXYGEN SATURATION: 100 % | RESPIRATION RATE: 18 BRPM | HEIGHT: 64 IN | WEIGHT: 230 LBS | BODY MASS INDEX: 39.27 KG/M2

## 2024-09-26 DIAGNOSIS — O20.0 THREATENED MISCARRIAGE: Primary | ICD-10-CM

## 2024-09-26 LAB
ABORH RETYPE: NORMAL
B-HCG FREE SERPL-ACNC: ABNORMAL MIU/ML
B-HCG UR QL: POSITIVE
BACTERIA #/AREA URNS AUTO: ABNORMAL /HPF
BASOPHILS # BLD AUTO: 0.02 X10(3)/MCL
BASOPHILS NFR BLD AUTO: 0.3 %
BILIRUB UR QL STRIP.AUTO: NEGATIVE
CLARITY UR: CLEAR
COLOR UR AUTO: ABNORMAL
EOSINOPHIL # BLD AUTO: 0.03 X10(3)/MCL (ref 0–0.9)
EOSINOPHIL NFR BLD AUTO: 0.5 %
ERYTHROCYTE [DISTWIDTH] IN BLOOD BY AUTOMATED COUNT: 14.5 % (ref 11.5–17)
GLUCOSE UR QL STRIP: NORMAL
GROUP & RH: NORMAL
HCT VFR BLD AUTO: 35.5 % (ref 37–47)
HGB BLD-MCNC: 11 G/DL (ref 12–16)
HGB UR QL STRIP: ABNORMAL
IMM GRANULOCYTES # BLD AUTO: 0.02 X10(3)/MCL (ref 0–0.04)
IMM GRANULOCYTES NFR BLD AUTO: 0.3 %
INDIRECT COOMBS: NORMAL
KETONES UR QL STRIP: NEGATIVE
LEUKOCYTE ESTERASE UR QL STRIP: NEGATIVE
LYMPHOCYTES # BLD AUTO: 1.92 X10(3)/MCL (ref 0.6–4.6)
LYMPHOCYTES NFR BLD AUTO: 31.2 %
MCH RBC QN AUTO: 25.9 PG (ref 27–31)
MCHC RBC AUTO-ENTMCNC: 31 G/DL (ref 33–36)
MCV RBC AUTO: 83.7 FL (ref 80–94)
MONOCYTES # BLD AUTO: 0.51 X10(3)/MCL (ref 0.1–1.3)
MONOCYTES NFR BLD AUTO: 8.3 %
NEUTROPHILS # BLD AUTO: 3.65 X10(3)/MCL (ref 2.1–9.2)
NEUTROPHILS NFR BLD AUTO: 59.4 %
NITRITE UR QL STRIP: NEGATIVE
NRBC BLD AUTO-RTO: 0 %
PH UR STRIP: 5.5 [PH]
PLATELET # BLD AUTO: 185 X10(3)/MCL (ref 130–400)
PMV BLD AUTO: 11 FL (ref 7.4–10.4)
PROT UR QL STRIP: ABNORMAL
RBC # BLD AUTO: 4.24 X10(6)/MCL (ref 4.2–5.4)
RBC #/AREA URNS AUTO: >100 /HPF
SP GR UR STRIP.AUTO: 1.01 (ref 1–1.03)
SPECIMEN OUTDATE: NORMAL
SQUAMOUS #/AREA URNS LPF: ABNORMAL /HPF
UROBILINOGEN UR STRIP-ACNC: NORMAL
WBC # BLD AUTO: 6.15 X10(3)/MCL (ref 4.5–11.5)
WBC #/AREA URNS AUTO: ABNORMAL /HPF

## 2024-09-26 PROCEDURE — 84702 CHORIONIC GONADOTROPIN TEST: CPT | Performed by: EMERGENCY MEDICINE

## 2024-09-26 PROCEDURE — 99284 EMERGENCY DEPT VISIT MOD MDM: CPT | Mod: 25

## 2024-09-26 PROCEDURE — 81025 URINE PREGNANCY TEST: CPT | Performed by: EMERGENCY MEDICINE

## 2024-09-26 PROCEDURE — 81001 URINALYSIS AUTO W/SCOPE: CPT | Performed by: EMERGENCY MEDICINE

## 2024-09-26 PROCEDURE — 86850 RBC ANTIBODY SCREEN: CPT | Performed by: EMERGENCY MEDICINE

## 2024-09-26 PROCEDURE — 85025 COMPLETE CBC W/AUTO DIFF WBC: CPT | Performed by: EMERGENCY MEDICINE

## 2024-09-26 PROCEDURE — 86901 BLOOD TYPING SEROLOGIC RH(D): CPT | Performed by: EMERGENCY MEDICINE

## 2024-09-26 PROCEDURE — 86900 BLOOD TYPING SEROLOGIC ABO: CPT | Performed by: EMERGENCY MEDICINE

## 2024-09-26 NOTE — ED PROVIDER NOTES
Encounter Date: 2024    SCRIBE #1 NOTE: I, Delicia Zhong, am scribing for, and in the presence of,  Yobany Kirk MD. I have scribed the following portions of the note - Other sections scribed: HPI, ROS, PE.       History     Chief Complaint   Patient presents with    Vaginal Bleeding     C/o vaginal bleeding and abdominal cramping. Soaking 1 pad/hour. LMP , suspecting to be 6 weeks pregnant, no OB visit yet.      Patient is a 40 y/o female presents to the ED for abdominal cramping and vaginal bleeding beginning last night. Patient reports her last menstrual cycle was on . She reports taking an at home pregnancy test. She reports the pain occurs lower than her normal menstrual cramps. She reports heavy bleeding. She denies fever, chills, cough, congestion, dysuria, or blood in her urine. She reports she is taking folic acid at home. She denies smoking cigarettes. She denies having an OBGYN currently.    The history is provided by the patient. No  was used.   Vaginal Bleeding  This is a new problem. The current episode started 6 to 12 hours ago. Associated symptoms include abdominal pain. Pertinent negatives include no chest pain, no headaches and no shortness of breath.     Review of patient's allergies indicates:  No Known Allergies  Past Medical History:   Diagnosis Date    Dysuria 2024     Past Surgical History:   Procedure Laterality Date    COLONOSCOPY      for bloody stools. polyp     Family History   Problem Relation Name Age of Onset    Diabetes Mother Annamarie Caruso Diogo Isabel     Vision loss Father Flash Rodarte     Cancer Maternal Grandfather Flaco Lind Juan     Arthritis Maternal Grandmother Annamarie Driscoll     Diabetes Maternal Grandmother Annamarie Driscoll     Cancer Paternal Grandfather Jeremías Vilchis Cayden      Social History     Tobacco Use    Smoking status: Never    Smokeless tobacco: Never   Substance Use Topics     Alcohol use: Yes     Alcohol/week: 12.0 standard drinks of alcohol     Types: 12 Cans of beer per week    Drug use: Never     Review of Systems   Constitutional:  Negative for chills, fatigue and fever.   HENT:  Negative for congestion.    Eyes:  Negative for visual disturbance.   Respiratory:  Negative for cough and shortness of breath.    Cardiovascular:  Negative for chest pain and leg swelling.   Gastrointestinal:  Positive for abdominal pain. Negative for diarrhea, nausea and vomiting.   Genitourinary:  Positive for vaginal bleeding. Negative for dysuria and hematuria.   Musculoskeletal:  Negative for myalgias.   Neurological:  Negative for weakness, numbness and headaches.       Physical Exam     Initial Vitals [09/26/24 0433]   BP Pulse Resp Temp SpO2   137/66 (!) 54 18 97.9 °F (36.6 °C) 99 %      MAP       --         Physical Exam    Nursing note and vitals reviewed.  Constitutional: She appears well-developed and well-nourished.   HENT:   Head: Normocephalic and atraumatic.   Mouth/Throat: Oropharynx is clear and moist.   Eyes: Pupils are equal, round, and reactive to light.   Neck: Neck supple.   Cardiovascular:  Normal rate, regular rhythm, normal heart sounds and intact distal pulses.           Pulmonary/Chest: Breath sounds normal.   Abdominal: Abdomen is soft. There is no abdominal tenderness. There is no rebound and no guarding.   Genitourinary:    Genitourinary Comments: Pelvic exam chaperoned by EMT Loren  Small amount of blood at os, cervix closed.  Scant blood in vault.     Musculoskeletal:         General: No tenderness. Normal range of motion.      Cervical back: Neck supple.     Neurological: She is alert and oriented to person, place, and time. She has normal strength. No cranial nerve deficit or sensory deficit.   Skin: Skin is warm and dry. Capillary refill takes less than 2 seconds.   Psychiatric: She has a normal mood and affect.         ED Course   Procedures  Labs Reviewed   HCG,  QUANTITATIVE - Abnormal       Result Value    Beta HCG Quant 10,861.62 (*)    PREGNANCY TEST, URINE RAPID - Abnormal    hCG Qualitative, Urine Positive (*)    URINALYSIS, REFLEX TO URINE CULTURE - Abnormal    Color, UA Light-Brown (*)     Appearance, UA Clear      Specific Gravity, UA 1.007      pH, UA 5.5      Protein, UA 1+ (*)     Glucose, UA Normal      Ketones, UA Negative      Blood, UA 3+ (*)     Bilirubin, UA Negative      Urobilinogen, UA Normal      Nitrites, UA Negative      Leukocyte Esterase, UA Negative      RBC, UA >100 (*)     WBC, UA None Seen      Bacteria, UA Many (*)     Squamous Epithelial Cells, UA None Seen     CBC WITH DIFFERENTIAL - Abnormal    WBC 6.15      RBC 4.24      Hgb 11.0 (*)     Hct 35.5 (*)     MCV 83.7      MCH 25.9 (*)     MCHC 31.0 (*)     RDW 14.5      Platelet 185      MPV 11.0 (*)     Neut % 59.4      Lymph % 31.2      Mono % 8.3      Eos % 0.5      Basophil % 0.3      Lymph # 1.92      Neut # 3.65      Mono # 0.51      Eos # 0.03      Baso # 0.02      IG# 0.02      IG% 0.3      NRBC% 0.0     CBC W/ AUTO DIFFERENTIAL    Narrative:     The following orders were created for panel order CBC auto differential.  Procedure                               Abnormality         Status                     ---------                               -----------         ------                     CBC with Differential[[id number]]       Abnormal            Final result                 Please view results for these tests on the individual orders.   TYPE & SCREEN    Group & Rh O POS      Indirect Gamaliel GEL NEG      Specimen Outdate 09/29/2024 23:59     ABORH RETYPE          Imaging Results              US OB <14 Wks TransAbd & TransVag, Single Gestation (XPD) (Final result)  Result time 09/26/24 09:52:04      Final result by Duoglas Hearn MD (09/26/24 09:52:04)                   Impression:      Pregnancy of unknown location. Considerations include early nonvisualized intrauterine pregnancy,  failed pregnancy and ectopic pregnancy. Suggest correlation with beta-hCG and followup ultrasound as clinically appropriate.    Left ovary not visualized.      Electronically signed by: Douglas Hearn  Date:    09/26/2024  Time:    09:52               Narrative:    EXAMINATION:  US OB <14 WEEKS, TRANSABDOM & TRANSVAG, SINGLE GESTATION (XPD)    CLINICAL HISTORY:  Threatened Ab, vaginal bleeding and pain;    TECHNIQUE:  Transabdominal and endovaginal ultrasound of the pelvis.    COMPARISON:  No relevant comparison studies available at the time of dictation    FINDINGS:  No intrauterine gestational sac identified.    Right ovary contains a suspected corpus luteum measuring 14 mm.  Blood flow confirmed within the right ovary on spectral imaging.  Left ovary not visualized, felt to be obscured by bowel gas.    No significant pelvic ascites.    Measurements:    Uterus: 8.9 x 4.3 x 5.1 cm    Right ovary: 2.4 x 1.6 x 1.7 cm    Left ovary: Not seen                                       Medications - No data to display  Medical Decision Making  The differential diagnosis includes, but is not limited to, menstrual bleeding, spontaneous miscarriage, threatened miscarriage, and UTI.    Amount and/or Complexity of Data Reviewed  Labs: ordered.     Details: Quantitative hCG 10,000   Hemoglobin 11  Radiology: ordered.     Details: Ultrasound shows no IUP.  Can not completely visualize 1 of the ovaries, but otherwise no abnormalities            Scribe Attestation:   Scribe #1: I performed the above scribed service and the documentation accurately describes the services I performed. I attest to the accuracy of the note.    Attending Attestation:           Physician Attestation for Scribe:  Physician Attestation Statement for Scribe #1: I, Yobany Kirk MD, reviewed documentation, as scribed by Delicia Zhong in my presence, and it is both accurate and complete.             ED Course as of 09/26/24 1031   Thu Sep 26, 2024   1026  Patient likely with spontaneous Ab.  At this time not having any significant pain, certainly nontender abdomen just cramping, so less likely ectopic not visualized.  Discussed the findings with the patient and .  Will have come back in 2 days for repeat quantitative hCG.  Again patient and  understand that at this time likely a miscarriage, but still need to repeat the lab work to verify that diagnosis as well as not an ectopic.  Patient is blood type O positive [MP]   1030 Patient also understands return precautions for worsening pain or bleeding. [MP]      ED Course User Index  [MP] Yobany Kirk MD                           Clinical Impression:  Final diagnoses:  [O20.0] Threatened miscarriage (Primary)          ED Disposition Condition    Discharge Stable          ED Prescriptions    None       Follow-up Information       Follow up With Specialties Details Why Contact Info    [facility] - Emergency Dept Emergency Medicine Go to  If symptoms worsen, As needed [address] [facility] [address]  [phone]             Yobany Kirk MD  09/26/24 5519

## 2024-09-28 ENCOUNTER — HOSPITAL ENCOUNTER (EMERGENCY)
Facility: HOSPITAL | Age: 39
Discharge: HOME | End: 2024-09-28
Attending: EMERGENCY MEDICINE
Payer: COMMERCIAL

## 2024-09-28 VITALS
HEIGHT: 64 IN | RESPIRATION RATE: 14 BRPM | SYSTOLIC BLOOD PRESSURE: 119 MMHG | DIASTOLIC BLOOD PRESSURE: 66 MMHG | BODY MASS INDEX: 38.41 KG/M2 | OXYGEN SATURATION: 99 % | WEIGHT: 225 LBS | HEART RATE: 49 BPM | TEMPERATURE: 98 F

## 2024-09-28 DIAGNOSIS — O03.9 SPONTANEOUS ABORTION: Primary | ICD-10-CM

## 2024-09-28 LAB — B-HCG FREE SERPL-ACNC: 2206.38 MIU/ML

## 2024-09-28 PROCEDURE — 99283 EMERGENCY DEPT VISIT LOW MDM: CPT

## 2024-09-28 PROCEDURE — 84702 CHORIONIC GONADOTROPIN TEST: CPT | Performed by: EMERGENCY MEDICINE

## 2024-09-28 NOTE — ED PROVIDER NOTES
Encounter Date: 2024       History     Chief Complaint   Patient presents with    Labs Only     Pt seen here Thursday for threatened miscarriage. Pt in need of repeat HCG level. Pt states she continues to have vaginal bleeding, cramping. 6 weeks gestation, .      39-year-old female was G1 at approximately 6 weeks gestation presents ED for re-evaluation.  She was seen in the ED a couple of days ago and diagnosed with a suspected spontaneous .  She was told to return in 48 hours for repeat HCG which is why she presents today.  Vaginal bleeding has significantly decreased she continues to have some intermittent cramping but denies any abnormal vaginal discharge.  She was in her OBGYN appointment for     The history is provided by the patient and the spouse. No  was used.     Review of patient's allergies indicates:  No Known Allergies  Past Medical History:   Diagnosis Date    Dysuria 2024     Past Surgical History:   Procedure Laterality Date    COLONOSCOPY      for bloody stools. polyp     Family History   Problem Relation Name Age of Onset    Diabetes Mother Annamarie Caruso Diogo Isabel     Vision loss Father Flash Rodarte     Cancer Maternal Grandfather Flaco Diogo Martinez     Arthritis Maternal Grandmother Annamarie Driscoll     Diabetes Maternal Grandmother Annamarie Driscoll     Cancer Paternal Grandfather Jeremías Rodarte      Social History     Tobacco Use    Smoking status: Never    Smokeless tobacco: Never   Substance Use Topics    Alcohol use: Yes     Alcohol/week: 12.0 standard drinks of alcohol     Types: 12 Cans of beer per week    Drug use: Never     Review of Systems    Physical Exam     Initial Vitals [24 0736]   BP Pulse Resp Temp SpO2   110/64 (!) 50 20 98 °F (36.7 °C) 98 %      MAP       --         Physical Exam    Nursing note and vitals reviewed.  Constitutional: She appears well-developed and  well-nourished. She is not diaphoretic. No distress.   HENT:   Head: Normocephalic and atraumatic.   Nose: Nose normal.   Eyes: Conjunctivae are normal.   Cardiovascular:  Normal rate and regular rhythm.           Pulmonary/Chest: No respiratory distress.   Abdominal: Abdomen is soft. She exhibits no distension. There is no abdominal tenderness. There is no rebound.   Musculoskeletal:         General: Normal range of motion.     Neurological: She is alert and oriented to person, place, and time. She has normal strength. No cranial nerve deficit.   Skin: Skin is warm and dry. Capillary refill takes less than 2 seconds. No rash and no abscess noted. No erythema. No pallor.   Psychiatric: She has a normal mood and affect. Her behavior is normal. Judgment and thought content normal.         ED Course   Procedures  Labs Reviewed   HCG, QUANTITATIVE - Abnormal       Result Value    Beta HCG Quant .38 (*)           Imaging Results    None          Medications - No data to display  Medical Decision Making  Given patient's presentation, differential diagnosis includes but is not limited to spontaneous   To evaluate these  possible etiologies hcg were ordered and reviewed  hcg significantly decreased consistent with a spontaneous .  Bleeding is decreasing she was follow up with the OBGYN.  Discussed return precautions and she voiced understanding and agrees is comfortable with plan    Problems Addressed:  Spontaneous : acute illness or injury that poses a threat to life or bodily functions    Amount and/or Complexity of Data Reviewed  Independent Historian: spouse  External Data Reviewed: labs and notes.     Details: Recent visit with no iup, high enough hcg, O+  Labs: ordered. Decision-making details documented in ED Course.    Risk  OTC drugs.               ED Course as of 24 0842   Sat Sep 28, 2024   0839 Beta HCG Quant(!): .38 [BS]   0840 Discussed with the findings are consistent  with a spontaneous  encouraged to keep her follow up appointment with OBGYN [BS]      ED Course User Index  [BS] Corin Arenas MD                           Clinical Impression:  Final diagnoses:  [O03.9] Spontaneous  (Primary)          ED Disposition Condition    Discharge Stable          ED Prescriptions    None       Follow-up Information       Follow up With Specialties Details Why Contact Info    Kena Hillman MD Family Medicine Schedule an appointment as soon as possible for a visit   [address] [place] [id number]  [phone]      obgyn  Go to       [facility] - Emergency Dept Emergency Medicine  As needed, If symptoms worsen [facility] [facility] [address]  [phone]             Corin Arenas MD  24 0827

## 2025-07-31 ENCOUNTER — PATIENT OUTREACH (OUTPATIENT)
Facility: CLINIC | Age: 40
End: 2025-07-31
Payer: COMMERCIAL

## 2025-07-31 NOTE — PROGRESS NOTES
Population Health Outreach.    Last PAP 2022 Wnl per PCP notes but can not find who or where completed.     Contacted via: [facility]-message sent   Contacted about: Pop Health: Cervical Cancer Screening